# Patient Record
Sex: MALE | URBAN - METROPOLITAN AREA
[De-identification: names, ages, dates, MRNs, and addresses within clinical notes are randomized per-mention and may not be internally consistent; named-entity substitution may affect disease eponyms.]

---

## 2022-06-06 ENCOUNTER — TELEPHONE (OUTPATIENT)
Dept: FAMILY MEDICINE | Facility: CLINIC | Age: 57
End: 2022-06-06

## 2022-06-06 NOTE — TELEPHONE ENCOUNTER
----- Message from Rosio Leon sent at 6/6/2022 10:32 AM CDT -----  Regarding: questions  Contact: [phone]  Pt Questions      Questions: Pt calling to speak with the staff   Call Back number: [phone]